# Patient Record
Sex: FEMALE | Race: WHITE | Employment: PART TIME | URBAN - NONMETROPOLITAN AREA
[De-identification: names, ages, dates, MRNs, and addresses within clinical notes are randomized per-mention and may not be internally consistent; named-entity substitution may affect disease eponyms.]

---

## 2017-05-15 ENCOUNTER — OFFICE VISIT - GICH (OUTPATIENT)
Dept: FAMILY MEDICINE | Facility: OTHER | Age: 22
End: 2017-05-15

## 2017-05-15 ENCOUNTER — HISTORY (OUTPATIENT)
Dept: FAMILY MEDICINE | Facility: OTHER | Age: 22
End: 2017-05-15

## 2017-05-15 DIAGNOSIS — B97.89 OTHER VIRAL AGENTS AS THE CAUSE OF DISEASES CLASSIFIED ELSEWHERE: ICD-10-CM

## 2017-05-15 DIAGNOSIS — J06.9 ACUTE UPPER RESPIRATORY INFECTION: ICD-10-CM

## 2017-05-15 DIAGNOSIS — J02.9 ACUTE PHARYNGITIS: ICD-10-CM

## 2017-05-15 LAB — STREP A ANTIGEN - HISTORICAL: NEGATIVE

## 2017-07-12 ENCOUNTER — APPOINTMENT (OUTPATIENT)
Dept: OCCUPATIONAL MEDICINE | Facility: OTHER | Age: 22
End: 2017-07-12

## 2017-09-11 ENCOUNTER — OFFICE VISIT - GICH (OUTPATIENT)
Dept: FAMILY MEDICINE | Facility: OTHER | Age: 22
End: 2017-09-11

## 2017-09-11 ENCOUNTER — HISTORY (OUTPATIENT)
Dept: FAMILY MEDICINE | Facility: OTHER | Age: 22
End: 2017-09-11

## 2017-09-11 DIAGNOSIS — Z01.89 ENCOUNTER FOR OTHER SPECIFIED SPECIAL EXAMINATIONS (CODE): ICD-10-CM

## 2017-09-11 DIAGNOSIS — B97.89 OTHER VIRAL AGENTS AS THE CAUSE OF DISEASES CLASSIFIED ELSEWHERE: ICD-10-CM

## 2017-09-11 DIAGNOSIS — R50.9 FEVER: ICD-10-CM

## 2017-09-11 DIAGNOSIS — J06.9 ACUTE UPPER RESPIRATORY INFECTION: ICD-10-CM

## 2017-09-11 LAB
INFLUENZA ANTIGEN - HISTORICAL: NORMAL
STREP A ANTIGEN - HISTORICAL: NEGATIVE

## 2017-09-13 LAB — LYME SCREEN W/REFLEX WEST BLOT - HISTORICAL: NEGATIVE

## 2017-12-27 NOTE — PROGRESS NOTES
Patient Information     Patient Name MRN Sex Bambi Patricia [id number] Female 1995      Progress Notes by Amelia Cook NP at 2017  7:00 PM     Author:  Amelia Cook NP Service:  (none) Author Type:  PHYS- Nurse Practitioner     Filed:  2017  8:54 PM Encounter Date:  2017 Status:  Signed     :  Amelia Cook NP (PHYS- Nurse Practitioner)            HPI:    Bambi Cagle is a 22 y.o. female who presents to clinic today for fever.   States fevers at home up to 104 last night.  Sore throat, runny and stuffy nose started last night.  Minimal dry occasional cough to clear throat.   No difficulty breathing or shortness of breath.  Some neck soreness today.   No headaches.  Generalized fatigue and malaise.  No known tick bites.  No rashes.  No body aches or joint aches other than neck.   Appetite decreased.  Some nausea with eating, no vomiting, no diarrhea.  Alternating Tylenol and Ibuprofen.  States history of mono and lyme about 3 years ago.          Past Medical History:     Diagnosis  Date     No Significant Past Medical History      Past Surgical History:      Procedure  Laterality Date     extraction of tooth  2002     TONSIL AND ADENOIDECTOMY  2003     Social History       Substance Use Topics         Smoking status:   Never Smoker     Smokeless tobacco:   Never Used     Alcohol use   No      Comment: Occasional      Current Outpatient Prescriptions       Medication  Sig Dispense Refill     multivitamin (MULTIPLE VITAMINS) tablet Take 1 tablet by mouth once daily.  0     norgestimate-ethinyl estradiol (TRI-SPRINTEC) 0.18/0.215/0.25 mg-35 mcg (28) tablet Take 1 tablet by mouth once daily.  0     No current facility-administered medications for this visit.      Medications have been reviewed by me and are current to the best of my knowledge and ability.    No Known Allergies    Past medical history, past surgical history, current medications and allergies  "reviewed and accurate to the best of my knowledge.        ROS:  Refer to HPI    /78  Pulse 86  Temp 98.9  F (37.2  C) (Tympanic)   Resp 18  Ht 1.854 m (6' 1\")  Wt 82.5 kg (181 lb 12.8 oz)  LMP 08/28/2017  Breastfeeding? No  BMI 23.99 kg/m2    EXAM:  General Appearance: Well appearing adult female, non ill appearance, appropriate appearance for age. No acute distress  Head: normocephalic, atraumatic  Ears: Left TM with bony landmarks appreciated, no erythema, no effusion, no bulging, no purulence.  Right TM with bony landmarks appreciated, no erythema, no effusion, no bulging, no purulence.   Left auditory canal clear.  Right auditory canal clear.  Normal external ears, non tender.  Eyes: conjunctivae normal without erythema or irritation, no drainage, pupils equal  Orophayrnx: moist mucous membranes, posterior pharynx with erythema, tonsils surgically absent, no oral lesions or post nasal drainage.  Neck: supple without adenopathy  Respiratory: normal chest wall and respirations.  Normal effort.  Clear to auscultation bilaterally, no wheezing, crackles or rhonchi.  No increased work of breathing.  No cough appreciated.  Cardiac: RRR with no murmurs  Musculoskeletal:  Normal movement of neck.  Normal gait.  Equal movement of bilateral upper extremities.  Equal movement of bilateral lower extremities.    Psychological: normal affect, alert and pleasant      Labs:  Results for orders placed or performed in visit on 09/11/17       INFLUENZA ANTIGEN A & B       Result   Value Ref Range    INFLUENZA ANTIGEN                                 Negative for Influenza A antigen; Negative for Influenza B antigen     RAPID STREP WITH REFLEX CULTURE       Result   Value Ref Range    STREP A ANTIGEN            Negative Negative           ASSESSMENT/PLAN:    ICD-10-CM    1. Patient request for diagnostic testing Z01.89 INFLUENZA ANTIGEN A & B      RAPID STREP WITH REFLEX CULTURE      LYME SCREEN W/REFLEX      LYME " SCREEN W/REFLEX      INFLUENZA ANTIGEN A & B      RAPID STREP WITH REFLEX CULTURE   2. Fever in adult R50.9 INFLUENZA ANTIGEN A & B      RAPID STREP WITH REFLEX CULTURE      LYME SCREEN W/REFLEX      LYME SCREEN W/REFLEX      INFLUENZA ANTIGEN A & B      RAPID STREP WITH REFLEX CULTURE   3. Viral URI with cough J06.9      B97.89          Patient requests testing for strep, influenza, and lyme  Negative rapid strep test  Negative influenza test  Likely viral URI.  No antibiotics indicated at this time.    Lyme test pending  Encouraged fluids  Symptomatic treatment - salt water gargles, honey, elevation, humidifier, sinus rinse/netti pot, lozenges, etc   Tylenol or ibuprofen PRN  Follow up if symptoms persist or worsen or concerns          Patient Instructions   Negative rapid strep test    Influenza test pending    Lyme test pending    Symptoms likely due to virus. No antibiotic is needed at this time.       Most coughs are caused by a viral infection.   Usually coughs can last 2 to 3 weeks. Sometimes the cough becomes loose (wet) for a few days, and your child coughs up a lot of phlegm (mucus). This is usually a sign that the end of the illness is near.    Most sore throats are caused by viruses and are part of a cold. About 10% of sore throats are caused by strep bacteria.    Encouraged fluids and rest.    May use symptomatic care with tylenol or ibuprofen.     Using a humidifier works well to break up the congestion.     Elevate the mattress to 15 degrees in order to help with the congestion.    Frequent swallows of cool liquid.      Oatmeal or honey coats the throat and some patients find it soothes the pain.     Salt water gargles as needed    Return to clinic with change/worsening of symptoms or concerns.

## 2017-12-29 NOTE — PATIENT INSTRUCTIONS
Patient Information     Patient Name MRN Bambi Parada [id number] Female 1995      Patient Instructions by Amelia Cook NP at 2017  7:00 PM     Author:  Amelia Cook NP Service:  (none) Author Type:  PHYS- Nurse Practitioner     Filed:  2017  7:52 PM Encounter Date:  2017 Status:  Signed     :  Amelia Cook NP (PHYS- Nurse Practitioner)            Negative rapid strep test    Influenza test pending    Lyme test pending    Symptoms likely due to virus. No antibiotic is needed at this time.       Most coughs are caused by a viral infection.   Usually coughs can last 2 to 3 weeks. Sometimes the cough becomes loose (wet) for a few days, and your child coughs up a lot of phlegm (mucus). This is usually a sign that the end of the illness is near.    Most sore throats are caused by viruses and are part of a cold. About 10% of sore throats are caused by strep bacteria.    Encouraged fluids and rest.    May use symptomatic care with tylenol or ibuprofen.     Using a humidifier works well to break up the congestion.     Elevate the mattress to 15 degrees in order to help with the congestion.    Frequent swallows of cool liquid.      Oatmeal or honey coats the throat and some patients find it soothes the pain.     Salt water gargles as needed    Return to clinic with change/worsening of symptoms or concerns.

## 2017-12-30 NOTE — NURSING NOTE
Patient Information     Patient Name MRN Sex Bambi Patricia [id number] Female 1995      Nursing Note by Carol Schneider at 2017  7:00 PM     Author:  Carol Schneider Service:  (none) Author Type:  NURS- Student Practical Nurse     Filed:  2017  7:06 PM Encounter Date:  2017 Status:  Signed     :  Carol Schneider (NURS- Student Practical Nurse)            Patient presents to the clinic for high fevers. Think she might have influenza. Fevers has been running around 104 at home. Has been using tylenol and ibuprofen for fever control. States she is so lethargic she is sleeping more than 12 hours at a time. Started yesterday morning. Has recently had mono and limes.   Carol Schneider LPN............................ 2017 6:53 PM

## 2018-01-05 NOTE — NURSING NOTE
Patient Information     Patient Name MRN Bambi Parada [id number] Female 1995      Nursing Note by Charlene Garcia at 5/15/2017  1:00 PM     Author:  Charlene Garcia Service:  (none) Author Type:  (none)     Filed:  5/15/2017 12:59 PM Encounter Date:  5/15/2017 Status:  Signed     :  Charlene Garcia            Patient presents to clinic with concerns about an URI she has had for about a week.  Charlene GarciaLPN ....................  5/15/2017   12:51 PM

## 2018-01-05 NOTE — PROGRESS NOTES
Patient Information     Patient Name MRN Sex Bambi Patricia [id number] Female 1995      Progress Notes by Amelia Cook NP at 5/15/2017  1:00 PM     Author:  Amelia Cook NP Service:  (none) Author Type:  PHYS- Nurse Practitioner     Filed:  5/15/2017  2:02 PM Encounter Date:  5/15/2017 Status:  Signed     :  Amelia Cook NP (PHYS- Nurse Practitioner)            HPI:    Bambi Cagle is a 22 y.o. female who presents to clinic today for URI.  Started a week ago with irritated sore throat.  Throat with tingling sensation and sore for the past week.  Cough for the past 3-4 days.  Coughing up thick yellow phlegm in the mornings, mild phlegm through out the day.  Chest tightness and some shortness of breath.  Runny and stuffy nose.  Post nasal drainage.  Cervical lymph nodes sore to touch.  Feeling fatigued and run down.  Low energy.  Intermittent headaches.  No body aches.  No fevers.  No chills.  Some hot spells/sweats.  Appetite decreased.  States symptoms were worsening every day, until today, now symptoms are the same without improvement or worsening.  Under a lot of stress, mother just passed away 3 weeks ago.  Also works in hospital.  Taking Dayquil, Nyquil, Ibuprofen and Emergent C (nothing today).               Past Medical History:     Diagnosis  Date     No Significant Past Medical History      Past Surgical History:      Procedure  Laterality Date     extraction of tooth  2002     TONSIL AND ADENOIDECTOMY  2003     Social History       Substance Use Topics         Smoking status:   Never Smoker     Smokeless tobacco:   Never Used     Alcohol use   No      Comment: Occasional      Current Outpatient Prescriptions       Medication  Sig Dispense Refill     multivitamin (MULTIPLE VITAMINS) tablet Take 1 tablet by mouth once daily.  0     norgestimate-ethinyl estradiol (TRI-SPRINTEC) 0.18/0.215/0.25 mg-35 mcg (28) tablet Take 1 tablet by mouth once daily.  0     No  "current facility-administered medications for this visit.      Medications have been reviewed by me and are current to the best of my knowledge and ability.    No Known Allergies    Past medical history, past surgical history, current medications and allergies reviewed and accurate to the best of my knowledge.        ROS:  Refer to HPI    /62  Pulse 80  Temp 98.8  F (37.1  C) (Tympanic)   Resp 18  Ht 1.854 m (6' 1\")  Wt 82.8 kg (182 lb 9.6 oz)  LMP 05/07/2017  Breastfeeding? No  BMI 24.09 kg/m2    EXAM:  General Appearance: Well appearing female adolescent, appropriate appearance for age. No acute distress  Head: normocephalic, atraumatic  Ears: Left TM with bony landmarks appreciated, no erythema, no effusion, no bulging, no purulence.  Right TM with bony landmarks appreciated, no erythema, no effusion, no bulging, no purulence.   Left auditory canal clear.  Right auditory canal clear.  Normal external ears, non tender.  Eyes: conjunctivae normal, no drainage  Orophayrnx: moist mucous membranes, posterior pharynx with mild erythema, tonsils surgically absent, moderate post nasal drip seen.    Neck: supple without adenopathy  Respiratory: normal chest wall and respirations.  Normal effort.  Clear to auscultation bilaterally, no wheezing, crackles or rhonchi.  No increased work of breathing.  No cough appreciated.  Cardiac: RRR with no murmurs  Psychological: normal affect, alert and pleasant      Labs:  Results for orders placed or performed in visit on 05/15/17      RAPID STREP WITH REFLEX CULTURE      Result  Value Ref Range    STREP A ANTIGEN           Negative Negative           ASSESSMENT/PLAN:    ICD-10-CM    1. Sore throat J02.9 RAPID STREP WITH REFLEX CULTURE      RAPID STREP WITH REFLEX CULTURE   2. Viral URI with cough J06.9      B97.89          Negative rapid strep test  Symptoms and exam consistent with viral URI - no fevers, symptoms of 3-7 days  Encouraged fluids  Symptomatic treatment - " salt water gargles, honey, elevation, humidifier, sinus rinse/netti pot, lozenges, etc   Tylenol or ibuprofen PRN  Follow up if symptoms persist or worsen or concerns (especially cough)        Patient Instructions   Negative rapid strep test    Symptoms likely due to virus. No antibiotic is needed at this time.       Most coughs are caused by a viral infection.   Usually coughs can last 2 to 3 weeks. Sometimes the cough becomes loose (wet) for a few days, and your child coughs up a lot of phlegm (mucus). This is usually a sign that the end of the illness is near.    Most sore throats are caused by viruses and are part of a cold. About 10% of sore throats are caused by strep bacteria.    Encouraged fluids and rest.    May use symptomatic care with tylenol or ibuprofen.     Using a humidifier works well to break up the congestion.     Elevate the mattress to 15 degrees in order to help with the congestion.    Frequent swallows of cool liquid.      Oatmeal or honey coats the throat and some patients find it soothes the pain.     Salt water gargles as needed    Return to clinic with change/worsening of symptoms or concerns.

## 2018-01-05 NOTE — PATIENT INSTRUCTIONS
Patient Information     Patient Name MRN Bambi Parada [id number] Female 1995      Patient Instructions by Amelia Cook NP at 5/15/2017  1:00 PM     Author:  Amelia Cook NP Service:  (none) Author Type:  PHYS- Nurse Practitioner     Filed:  5/15/2017  1:29 PM Encounter Date:  5/15/2017 Status:  Signed     :  Amelia Cook NP (PHYS- Nurse Practitioner)            Negative rapid strep test    Symptoms likely due to virus. No antibiotic is needed at this time.       Most coughs are caused by a viral infection.   Usually coughs can last 2 to 3 weeks. Sometimes the cough becomes loose (wet) for a few days, and your child coughs up a lot of phlegm (mucus). This is usually a sign that the end of the illness is near.    Most sore throats are caused by viruses and are part of a cold. About 10% of sore throats are caused by strep bacteria.    Encouraged fluids and rest.    May use symptomatic care with tylenol or ibuprofen.     Using a humidifier works well to break up the congestion.     Elevate the mattress to 15 degrees in order to help with the congestion.    Frequent swallows of cool liquid.      Oatmeal or honey coats the throat and some patients find it soothes the pain.     Salt water gargles as needed    Return to clinic with change/worsening of symptoms or concerns.

## 2018-01-09 ENCOUNTER — OFFICE VISIT - GICH (OUTPATIENT)
Dept: OTOLARYNGOLOGY | Facility: OTHER | Age: 23
End: 2018-01-09

## 2018-01-09 DIAGNOSIS — Z00.00 ENCOUNTER FOR GENERAL ADULT MEDICAL EXAMINATION WITHOUT ABNORMAL FINDINGS: ICD-10-CM

## 2018-01-26 VITALS
HEART RATE: 80 BPM | SYSTOLIC BLOOD PRESSURE: 118 MMHG | DIASTOLIC BLOOD PRESSURE: 62 MMHG | WEIGHT: 182.6 LBS | BODY MASS INDEX: 24.73 KG/M2 | RESPIRATION RATE: 18 BRPM | HEIGHT: 72 IN | TEMPERATURE: 98.8 F

## 2018-01-26 VITALS
WEIGHT: 181.8 LBS | SYSTOLIC BLOOD PRESSURE: 122 MMHG | RESPIRATION RATE: 18 BRPM | BODY MASS INDEX: 24.62 KG/M2 | HEIGHT: 72 IN | HEART RATE: 86 BPM | DIASTOLIC BLOOD PRESSURE: 78 MMHG | TEMPERATURE: 98.9 F

## 2018-02-09 ENCOUNTER — DOCUMENTATION ONLY (OUTPATIENT)
Dept: FAMILY MEDICINE | Facility: OTHER | Age: 23
End: 2018-02-09

## 2018-02-09 RX ORDER — NORGESTIMATE AND ETHINYL ESTRADIOL 7DAYSX3 28
1 KIT ORAL DAILY
COMMUNITY
Start: 2013-05-16 | End: 2021-10-17

## 2018-02-09 RX ORDER — DIPHENOXYLATE HYDROCHLORIDE AND ATROPINE SULFATE 2.5; .025 MG/1; MG/1
1 TABLET ORAL DAILY
COMMUNITY
Start: 2013-05-16

## 2018-02-13 NOTE — PROGRESS NOTES
Patient Information     Patient Name MRN Bambi Parada [id number] Female 1995      Progress Notes by Zelda Mckeon at 2018 12:40 PM     Author:  Zelda Mckeon Service:  (none) Author Type:  (none)     Filed:  2018  9:41 AM Encounter Date:  2018 Status:  Signed     :  Zelda Mckeon            See scanned document.

## 2018-05-01 ENCOUNTER — OFFICE VISIT (OUTPATIENT)
Dept: OTOLARYNGOLOGY | Facility: OTHER | Age: 23
End: 2018-05-01
Attending: OTOLARYNGOLOGY
Payer: COMMERCIAL

## 2018-05-01 DIAGNOSIS — Z09 POSTOP CHECK: Primary | ICD-10-CM

## 2018-05-01 PROCEDURE — G0463 HOSPITAL OUTPT CLINIC VISIT: HCPCS

## 2018-05-01 NOTE — PROGRESS NOTES
ORACIO DANGELO    23 Y old Female, : 1995    Account Number: [id number]    [address], [place]    Home: [phone]     Guarantor: ORACIO DANGELO Insurance: [employer] Payer ID: [id number]   PCP: SAMIR HERNÁNDEZ   Appointment Facility: [facility]      2018 Progress Notes: Jose Rao MD        Reason for Appointment     1. PO THYROID/SWELLING     2. Postop check     History of Present Illness     HPI:   The patient is a 23-year-old female who had thyroid surgery yesterday. She called because of concerns of swelling. I asked her to come to the office today for evaluation.     Examination     General Examination:  Her voice is excellent.   Her wound looks good. There is absolutely minimal swelling or fluid collection. There is no evidence of infection.       Assessments     1. Postop check - Z09 (Primary)     Treatment     1. Others   Notes: Doing well, she will keep her follow-up appointment as scheduled.  Procedures  [ ].                Follow Up     2 Weeks                         Electronically signed by JOSE RAO MD on 2018 at 01:16 PM CDT     Sign off status: Completed         Review of Systems     [facility]  [address] [place] [address]  Tel: [phone]  Fax:       [ ].           Patient: ORACIO DANGELO : 1995 Progress Note: Jose Rao MD 2018        Note generated by [employer] EMR/PM Software ([email or web address])

## 2018-05-01 NOTE — MR AVS SNAPSHOT
"              After Visit Summary   2018    Bambi Cagle    MRN: [id number]           Patient Information     Date Of Birth          1995        Visit Information        Provider Department      2018 11:40 AM Jose Rao MD [facility]        Today's Diagnoses     Postop check    -  1       Follow-ups after your visit        Who to contact     If you have questions or need follow up information about today's clinic visit or your schedule please contact [facility] directly at [phone].  Normal or non-critical lab and imaging results will be communicated to you by [employer], letter or phone within 4 business days after the clinic has received the results. If you do not hear from us within 7 days, please contact the clinic through [employer] or phone. If you have a critical or abnormal lab result, we will notify you by phone as soon as possible.  Submit refill requests through [employer] or call your pharmacy and they will forward the refill request to us. Please allow 3 business days for your refill to be completed.          Additional Information About Your Visit        MyChart Information     [employer] lets you send messages to your doctor, view your test results, renew your prescriptions, schedule appointments and more. To sign up, go to [email or web address] . Click on \"Log in\" on the left side of the screen, which will take you to the Welcome page. Then click on \"Sign up Now\" on the right side of the page.     You will be asked to enter the access code listed below, as well as some personal information. Please follow the directions to create your username and password.     Your access code is: [email or web address]  Expires: 2018  7:28 AM     Your access code will  in 90 days. If you need help or a new code, please call your [facility] or [phone].        Care EveryWhere ID     This is your Care EveryWhere ID. This could be used by other " organizations to access your [place] medical records  [id number]         Blood Pressure from Last 3 Encounters:   09/11/17 122/78   05/15/17 118/62   12/19/16 100/60    Weight from Last 3 Encounters:   09/11/17 82.5 kg (181 lb 12.8 oz)   05/15/17 82.8 kg (182 lb 9.6 oz)   12/19/16 81.2 kg (179 lb)              Today, you had the following     No orders found for display       Primary Care Provider Office Phone # Fax #    [place] [phone]       [facility] [address] [facility] [address]        Equal Access to Services     LUIS ALBERTO PRAKASH : Hadii maria g Casillas, waselena cruz, qakelle kaalmada liam, stanton norwood. So [facility] [phone].    ATENCIÓN: Si habla español, tiene a chun disposición servicios gratuitos de asistencia lingüística. Llame al [phone].    We comply with applicable federal civil rights laws and [place] laws. We do not discriminate on the basis of race, color, national origin, age, disability, sex, sexual orientation, or gender identity.            Thank you!     Thank you for choosing [facility] AND [facility]  for your care. Our goal is always to provide you with excellent care. Hearing back from our patients is one way we can continue to improve our services. Please take a few minutes to complete the written survey that you may receive in the mail after your visit with us. Thank you!             Your Updated Medication List - Protect others around you: Learn how to safely use, store and throw away your medicines at www.disposemymeds.org.          This list is accurate as of 5/1/18 11:59 PM.  Always use your most recent med list.                   Brand Name Dispense Instructions for use Diagnosis    DAILY MULTIVITAMIN PO           IBUPROFEN PO      Take 400 mg by mouth every 6 hours as needed for moderate pain        MULTI-VITAMINS Tabs      Take 1 tablet by mouth daily        * TRI-SPRINTEC 0.18/0.215/0.25  MG-35 MCG per tablet   Generic drug:  norgestim-eth estrad triphasic      Take 1 tablet by mouth daily        * norgestim-eth estrad triphasic 0.18/0.215/0.25 MG-35 MCG per tablet    ORTHO TRI-CYCLEN, TRI-SPRINTEC     Take 1 tablet by mouth daily        * Notice:  This list has 2 medication(s) that are the same as other medications prescribed for you. Read the directions carefully, and ask your doctor or other care provider to review them with you.

## 2018-06-22 ENCOUNTER — OFFICE VISIT (OUTPATIENT)
Dept: FAMILY MEDICINE | Facility: OTHER | Age: 23
End: 2018-06-22
Attending: NURSE PRACTITIONER
Payer: COMMERCIAL

## 2018-06-22 VITALS
TEMPERATURE: 98.4 F | WEIGHT: 181 LBS | HEART RATE: 72 BPM | SYSTOLIC BLOOD PRESSURE: 118 MMHG | BODY MASS INDEX: 23.88 KG/M2 | RESPIRATION RATE: 16 BRPM | DIASTOLIC BLOOD PRESSURE: 76 MMHG

## 2018-06-22 DIAGNOSIS — R39.89 URINARY PROBLEM: ICD-10-CM

## 2018-06-22 DIAGNOSIS — A74.9 CHLAMYDIA: Primary | ICD-10-CM

## 2018-06-22 LAB
ALBUMIN UR-MCNC: NEGATIVE MG/DL
APPEARANCE UR: CLEAR
BILIRUB UR QL STRIP: NEGATIVE
C TRACH DNA SPEC QL PROBE+SIG AMP: ABNORMAL
COLOR UR AUTO: YELLOW
GLUCOSE UR STRIP-MCNC: NEGATIVE MG/DL
HGB UR QL STRIP: NEGATIVE
KETONES UR STRIP-MCNC: NEGATIVE MG/DL
LEUKOCYTE ESTERASE UR QL STRIP: NEGATIVE
N GONORRHOEA DNA SPEC QL PROBE+SIG AMP: NOT DETECTED
NITRATE UR QL: NEGATIVE
PH UR STRIP: 6 PH (ref 5–7)
SOURCE: NORMAL
SP GR UR STRIP: 1.02 (ref 1–1.03)
SPECIMEN SOURCE: ABNORMAL
UROBILINOGEN UR STRIP-ACNC: 0.2 EU/DL (ref 0.2–1)

## 2018-06-22 PROCEDURE — 87491 CHLMYD TRACH DNA AMP PROBE: CPT | Performed by: NURSE PRACTITIONER

## 2018-06-22 PROCEDURE — 81003 URINALYSIS AUTO W/O SCOPE: CPT | Performed by: NURSE PRACTITIONER

## 2018-06-22 PROCEDURE — 99213 OFFICE O/P EST LOW 20 MIN: CPT | Performed by: NURSE PRACTITIONER

## 2018-06-22 PROCEDURE — 87591 N.GONORRHOEAE DNA AMP PROB: CPT | Performed by: NURSE PRACTITIONER

## 2018-06-22 RX ORDER — NORGESTIMATE AND ETHINYL ESTRADIOL 7DAYSX3 28
KIT ORAL
COMMUNITY
Start: 2018-05-29 | End: 2021-10-17

## 2018-06-23 ENCOUNTER — TELEPHONE (OUTPATIENT)
Dept: FAMILY MEDICINE | Facility: OTHER | Age: 23
End: 2018-06-23

## 2018-06-23 DIAGNOSIS — A74.9 CHLAMYDIA INFECTION: Primary | ICD-10-CM

## 2018-06-23 DIAGNOSIS — A74.9 CHLAMYDIA INFECTION: ICD-10-CM

## 2018-06-23 RX ORDER — AZITHROMYCIN 500 MG/1
1000 TABLET, FILM COATED ORAL ONCE
Qty: 2 TABLET | Refills: 0 | Status: SHIPPED | OUTPATIENT
Start: 2018-06-23 | End: 2018-06-23

## 2018-06-23 ASSESSMENT — ENCOUNTER SYMPTOMS
DYSURIA: 0
FREQUENCY: 1
HEMATURIA: 0
FEVER: 0

## 2018-06-23 NOTE — PROGRESS NOTES
"HPI Comments: Nursing Notes:   Ranjan Rocha, LPN  6/22/2018  7:56 PM  Unsigned  Patient presents to clinic for complaint of urinary frequency, urgency,   and general vaginal discomfort. She says symptoms began a couple weeks ago   and have gotten worse recently. She has concerns that the issues may be   STI related.  Ranjan Rocha LPN...... 7:54 PM 6/22/2018    More freuqently and urgently needing to go to the bathroom-symptoms started several weeks ago. Uncomfortable \"down there.\"  Recently caught boyfriend cheating on her concerned about STD. Denies blood in urine or fevers, vaginal discharge. Hasn't treated symptoms at this time. History of frequent UTIs.     UTI   Pertinent negatives include no fever.         Review of Systems   Constitutional: Negative for fever.   Genitourinary: Positive for frequency and urgency. Negative for dysuria and hematuria.         Physical Exam   Constitutional: She is well-developed, well-nourished, and in no distress.   Genitourinary: Vagina normal and cervix normal.   Neurological: She is alert.   Skin: Skin is warm and dry.   Psychiatric: Affect normal.     Assessment: well appearing female without fever, vagina and cervix intact without erythema, scant white discharge without odor    Results for orders placed or performed in visit on 06/22/18   *UA reflex to Microscopic   Result Value Ref Range    Color Urine Yellow     Appearance Urine Clear     Glucose Urine Negative NEG^Negative mg/dL    Bilirubin Urine Negative NEG^Negative    Ketones Urine Negative NEG^Negative mg/dL    Specific Gravity Urine 1.025 1.003 - 1.035    Blood Urine Negative NEG^Negative    pH Urine 6.0 5.0 - 7.0 pH    Protein Albumin Urine Negative NEG^Negative mg/dL    Urobilinogen Urine 0.2 0.2 - 1.0 EU/dL    Nitrite Urine Negative NEG^Negative    Leukocyte Esterase Urine Negative NEG^Negative    Source Midstream Urine    GC/Chlamydia by PCR - HI,[facility]   Result Value Ref Range    Specimen Source Vagina     " Neisseria gonorrhoreae PCR Not Detected NDET^Not Detected    Chlamydia Trachomatis PCR Detected, Abnormal Result (AA) NDET^Not Detected     Diagnosis: Chlamydia    Treat with Zithromax 1000 mgs once  No sex for 7 days  Follow up as needed

## 2018-06-23 NOTE — TELEPHONE ENCOUNTER
Patient called stating the pharmacy previously stated is closed today and would like medication sent to other pharmacy on [address] [address] in [place], [place]    Laly Malagon on 6/23/2018 at 1:14 PM    Noted, new rx sent in.     Alicia Dash NP on 6/23/2018 at 1:32 PM

## 2018-06-23 NOTE — NURSING NOTE
Patient presents to clinic for complaint of urinary frequency, urgency, and general vaginal discomfort. She says symptoms began a couple weeks ago and have gotten worse recently. She has concerns that the issues may be STI related.  Ranjan Rocha LPN...... 7:54 PM 6/22/2018

## 2019-09-09 ENCOUNTER — APPOINTMENT (OUTPATIENT)
Dept: OCCUPATIONAL MEDICINE | Facility: OTHER | Age: 24
End: 2019-09-09

## 2019-09-09 PROCEDURE — 99080 SPECIAL REPORTS OR FORMS: CPT

## 2019-09-09 PROCEDURE — 99199 UNLISTED SPECIAL SVC PX/RPRT: CPT

## 2020-04-21 ENCOUNTER — HOSPITAL ENCOUNTER (EMERGENCY)
Facility: HOSPITAL | Age: 25
Discharge: HOME | End: 2020-04-22
Attending: EMERGENCY MEDICINE | Admitting: EMERGENCY MEDICINE
Payer: COMMERCIAL

## 2020-04-21 ENCOUNTER — APPOINTMENT (OUTPATIENT)
Dept: GENERAL RADIOLOGY | Facility: HOSPITAL | Age: 25
End: 2020-04-21
Attending: EMERGENCY MEDICINE
Payer: COMMERCIAL

## 2020-04-21 ENCOUNTER — APPOINTMENT (OUTPATIENT)
Dept: CT IMAGING | Facility: HOSPITAL | Age: 25
End: 2020-04-21
Attending: EMERGENCY MEDICINE
Payer: COMMERCIAL
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
## 2020-04-21 DIAGNOSIS — S09.90XA CLOSED HEAD INJURY, INITIAL ENCOUNTER: ICD-10-CM

## 2020-04-21 LAB
ANION GAP SERPL CALCULATED.3IONS-SCNC: 4 MMOL/L (ref 3–14)
BASOPHILS # BLD AUTO: 0 10E9/L (ref 0–0.2)
BASOPHILS NFR BLD AUTO: 0.3 %
BUN SERPL-MCNC: 10 MG/DL (ref 7–30)
CALCIUM SERPL-MCNC: 9.3 MG/DL (ref 8.5–10.1)
CHLORIDE SERPL-SCNC: 108 MMOL/L (ref 94–109)
CO2 SERPL-SCNC: 27 MMOL/L (ref 20–32)
CREAT SERPL-MCNC: 0.98 MG/DL (ref 0.52–1.04)
DIFFERENTIAL METHOD BLD: ABNORMAL
EOSINOPHIL # BLD AUTO: 0.1 10E9/L (ref 0–0.7)
EOSINOPHIL NFR BLD AUTO: 0.7 %
ERYTHROCYTE [DISTWIDTH] IN BLOOD BY AUTOMATED COUNT: 11.8 % (ref 10–15)
GFR SERPL CREATININE-BSD FRML MDRD: 80 ML/MIN/{1.73_M2}
GLUCOSE SERPL-MCNC: 82 MG/DL (ref 70–99)
HCG UR QL: NEGATIVE
HCT VFR BLD AUTO: 42.7 % (ref 35–47)
HGB BLD-MCNC: 14.5 G/DL (ref 11.7–15.7)
IMM GRANULOCYTES # BLD: 0.1 10E9/L (ref 0–0.4)
IMM GRANULOCYTES NFR BLD: 0.5 %
LACTATE BLD-SCNC: 1.9 MMOL/L (ref 0.7–2)
LIPASE SERPL-CCNC: 159 U/L (ref 73–393)
LYMPHOCYTES # BLD AUTO: 3.2 10E9/L (ref 0.8–5.3)
LYMPHOCYTES NFR BLD AUTO: 25.2 %
MCH RBC QN AUTO: 30.3 PG (ref 26.5–33)
MCHC RBC AUTO-ENTMCNC: 34 G/DL (ref 31.5–36.5)
MCV RBC AUTO: 89 FL (ref 78–100)
MONOCYTES # BLD AUTO: 1.1 10E9/L (ref 0–1.3)
MONOCYTES NFR BLD AUTO: 8.8 %
NEUTROPHILS # BLD AUTO: 8.3 10E9/L (ref 1.6–8.3)
NEUTROPHILS NFR BLD AUTO: 64.5 %
NRBC # BLD AUTO: 0 10*3/UL
NRBC BLD AUTO-RTO: 0 /100
PLATELET # BLD AUTO: 307 10E9/L (ref 150–450)
POTASSIUM SERPL-SCNC: 4 MMOL/L (ref 3.4–5.3)
RBC # BLD AUTO: 4.79 10E12/L (ref 3.8–5.2)
SODIUM SERPL-SCNC: 139 MMOL/L (ref 133–144)
WBC # BLD AUTO: 12.8 10E9/L (ref 4–11)

## 2020-04-21 PROCEDURE — 36415 COLL VENOUS BLD VENIPUNCTURE: CPT | Performed by: EMERGENCY MEDICINE

## 2020-04-21 PROCEDURE — 25000132 ZZH RX MED GY IP 250 OP 250 PS 637: Performed by: EMERGENCY MEDICINE

## 2020-04-21 PROCEDURE — 99284 EMERGENCY DEPT VISIT MOD MDM: CPT | Mod: Z6 | Performed by: EMERGENCY MEDICINE

## 2020-04-21 PROCEDURE — 73590 X-RAY EXAM OF LOWER LEG: CPT | Mod: TC,RT

## 2020-04-21 PROCEDURE — 99284 EMERGENCY DEPT VISIT MOD MDM: CPT | Mod: 25

## 2020-04-21 PROCEDURE — 83690 ASSAY OF LIPASE: CPT | Performed by: EMERGENCY MEDICINE

## 2020-04-21 PROCEDURE — 99285 EMERGENCY DEPT VISIT HI MDM: CPT | Mod: 25

## 2020-04-21 PROCEDURE — 70450 CT HEAD/BRAIN W/O DYE: CPT | Mod: TC

## 2020-04-21 PROCEDURE — 72125 CT NECK SPINE W/O DYE: CPT | Mod: TC

## 2020-04-21 PROCEDURE — 71045 X-RAY EXAM CHEST 1 VIEW: CPT | Mod: TC

## 2020-04-21 PROCEDURE — 85025 COMPLETE CBC W/AUTO DIFF WBC: CPT | Performed by: EMERGENCY MEDICINE

## 2020-04-21 PROCEDURE — 83605 ASSAY OF LACTIC ACID: CPT | Performed by: EMERGENCY MEDICINE

## 2020-04-21 PROCEDURE — 81025 URINE PREGNANCY TEST: CPT | Performed by: EMERGENCY MEDICINE

## 2020-04-21 PROCEDURE — 80048 BASIC METABOLIC PNL TOTAL CA: CPT | Performed by: EMERGENCY MEDICINE

## 2020-04-21 RX ORDER — ACETAMINOPHEN 325 MG/1
650 TABLET ORAL ONCE
Status: COMPLETED | OUTPATIENT
Start: 2020-04-21 | End: 2020-04-21

## 2020-04-21 RX ADMIN — ACETAMINOPHEN 650 MG: 325 TABLET, FILM COATED ORAL at 23:47

## 2020-04-21 NOTE — ED AVS SNAPSHOT
[facility]  [address] [address]  Phone:  [phone]                                    Bambi Cagle   MRN: [id number]    Department:  [facility]   Date of Visit:  4/21/2020           After Visit Summary Signature Page    I have received my discharge instructions, and my questions have been answered. I have discussed any challenges I see with this plan with the nurse or doctor.    ..........................................................................................................................................  Patient/Patient Representative Signature      ..........................................................................................................................................  Patient Representative Print Name and Relationship to Patient    ..................................................               ................................................  Date                                   Time    ..........................................................................................................................................  Reviewed by Signature/Title    ...................................................              ..............................................  Date                                               Time          22EPIC Rev 08/18

## 2020-04-22 VITALS
OXYGEN SATURATION: 98 % | DIASTOLIC BLOOD PRESSURE: 81 MMHG | SYSTOLIC BLOOD PRESSURE: 123 MMHG | TEMPERATURE: 98 F | RESPIRATION RATE: 16 BRPM | HEART RATE: 70 BPM

## 2020-04-22 NOTE — DISCHARGE INSTRUCTIONS
Follow the aftercare instructions provided    You must follow-up with your doctor in 12 to 24 hours or return to the ER for recheck
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
## 2020-04-28 ASSESSMENT — ENCOUNTER SYMPTOMS
CARDIOVASCULAR NEGATIVE: 1
HEADACHES: 1
RESPIRATORY NEGATIVE: 1
HEMATOLOGIC/LYMPHATIC NEGATIVE: 1
CONSTITUTIONAL NEGATIVE: 1
EYES NEGATIVE: 1
ENDOCRINE NEGATIVE: 1
NECK STIFFNESS: 1
PSYCHIATRIC NEGATIVE: 1
GASTROINTESTINAL NEGATIVE: 1
ALLERGIC/IMMUNOLOGIC NEGATIVE: 1

## 2021-07-11 ENCOUNTER — HOSPITAL ENCOUNTER (EMERGENCY)
Facility: OTHER | Age: 26
Discharge: HOME | End: 2021-07-11
Admitting: FAMILY MEDICINE
Payer: COMMERCIAL

## 2021-07-11 DIAGNOSIS — R55 VASOVAGAL SYNCOPE: ICD-10-CM

## 2021-07-11 PROCEDURE — 99282 EMERGENCY DEPT VISIT SF MDM: CPT | Performed by: FAMILY MEDICINE

## 2021-07-11 ASSESSMENT — ENCOUNTER SYMPTOMS
FEVER: 0
ABDOMINAL PAIN: 0
SHORTNESS OF BREATH: 0

## 2021-07-11 NOTE — DOWNTIME EVENT NOTE
The EMR was down for 5 hours on 7/11/2021.    Wade was responsible for completing the paper charting during this time period.     The following information was re-entered into the system by Femi Hoffmann RN: Allergies, Problem list and Home meds    The following information will remain in the paper chart: medications given, discharge instructions, assessments.     Femi Hoffmann RN  7/11/2021
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
## 2021-10-17 ENCOUNTER — OFFICE VISIT (OUTPATIENT)
Dept: FAMILY MEDICINE | Facility: OTHER | Age: 26
End: 2021-10-17
Attending: NURSE PRACTITIONER
Payer: COMMERCIAL

## 2021-10-17 VITALS
DIASTOLIC BLOOD PRESSURE: 72 MMHG | OXYGEN SATURATION: 100 % | TEMPERATURE: 98 F | SYSTOLIC BLOOD PRESSURE: 126 MMHG | BODY MASS INDEX: 25.41 KG/M2 | HEART RATE: 54 BPM | WEIGHT: 187.6 LBS | HEIGHT: 72 IN | RESPIRATION RATE: 16 BRPM

## 2021-10-17 DIAGNOSIS — R30.0 DYSURIA: Primary | ICD-10-CM

## 2021-10-17 LAB
ALBUMIN UR-MCNC: 10 MG/DL
APPEARANCE UR: CLEAR
BACTERIA #/AREA URNS HPF: ABNORMAL /HPF
BILIRUB UR QL STRIP: NEGATIVE
COLOR UR AUTO: ABNORMAL
GLUCOSE UR STRIP-MCNC: NEGATIVE MG/DL
HGB UR QL STRIP: ABNORMAL
KETONES UR STRIP-MCNC: NEGATIVE MG/DL
LEUKOCYTE ESTERASE UR QL STRIP: ABNORMAL
MUCOUS THREADS #/AREA URNS LPF: PRESENT /LPF
NITRATE UR QL: NEGATIVE
PH UR STRIP: 6 [PH] (ref 5–9)
RBC URINE: 94 /HPF
SP GR UR STRIP: 1.01 (ref 1–1.03)
UROBILINOGEN UR STRIP-MCNC: NORMAL MG/DL
WBC URINE: 20 /HPF

## 2021-10-17 PROCEDURE — 87086 URINE CULTURE/COLONY COUNT: CPT | Mod: ZL | Performed by: FAMILY MEDICINE

## 2021-10-17 PROCEDURE — 81001 URINALYSIS AUTO W/SCOPE: CPT | Mod: ZL

## 2021-10-17 PROCEDURE — 99213 OFFICE O/P EST LOW 20 MIN: CPT | Performed by: FAMILY MEDICINE

## 2021-10-17 RX ORDER — NORGESTIMATE AND ETHINYL ESTRADIOL 7DAYSX3 28
1 KIT ORAL
COMMUNITY
Start: 2020-09-23

## 2021-10-17 RX ORDER — FLUCONAZOLE 150 MG/1
TABLET ORAL
COMMUNITY
Start: 2021-04-27 | End: 2021-10-17

## 2021-10-17 RX ORDER — SULFAMETHOXAZOLE/TRIMETHOPRIM 800-160 MG
1 TABLET ORAL 2 TIMES DAILY
Qty: 14 TABLET | Refills: 0 | Status: SHIPPED | OUTPATIENT
Start: 2021-10-17 | End: 2021-10-24

## 2021-10-17 RX ORDER — NITROFURANTOIN 25; 75 MG/1; MG/1
CAPSULE ORAL
COMMUNITY
Start: 2021-08-29

## 2021-10-17 ASSESSMENT — MIFFLIN-ST. JEOR: SCORE: 1721.76

## 2021-10-17 ASSESSMENT — PAIN SCALES - GENERAL: PAINLEVEL: MILD PAIN (2)

## 2021-10-17 NOTE — NURSING NOTE
"Chief Complaint   Patient presents with     Frequency     burning with urination, pain, onset 10/15/21     Patient presents to [facility] for frequency and burning with urination. Pain 2/10. Onset of symptoms: 10/15/21.     Initial /72 (BP Location: Left arm, Patient Position: Sitting, Cuff Size: Adult Regular)   Pulse 54   Temp 98  F (36.7  C) (Tympanic)   Resp 16   Ht 1.867 m (6' 1.5\")   Wt 85.1 kg (187 lb 9.6 oz)   SpO2 100%   BMI 24.42 kg/m   Estimated body mass index is 24.42 kg/m  as calculated from the following:    Height as of this encounter: 1.867 m (6' 1.5\").    Weight as of this encounter: 85.1 kg (187 lb 9.6 oz).     FOOD SECURITY SCREENING QUESTIONS  Hunger Vital Signs:  Within the past 12 months we worried whether our food would run out before we got money to buy more. Never  Within the past 12 months the food we bought just didn't last and we didn't have money to get more. Never      Medication Reconciliation: Complete      Diane Huber LPN   "

## 2021-10-17 NOTE — PROGRESS NOTES
"  CHIEF COMPLAINT    Dysuria for 2 days.      HISTORY    26-year-old woman has dysuria and frequency as well as suprapubic pressure for about 2 days. No fevers or chills.    She has had an occasional UTI. One back in August.      REVIEW OF SYSTEMS    Unremarkable except as above.      EXAM  /72 (BP Location: Left arm, Patient Position: Sitting, Cuff Size: Adult Regular)   Pulse 54   Temp 98  F (36.7  C) (Tympanic)   Resp 16   Ht 1.867 m (6' 1.5\")   Wt 85.1 kg (187 lb 9.6 oz)   SpO2 100%   BMI 24.42 kg/m      Appears well in general.  HEENT unremarkable.  No cough or labored breathing.  Abdomen nondistended.      Results for orders placed or performed in visit on 10/17/21   UA reflex to Microscopic and Culture     Status: Abnormal    Specimen: Urine, Midstream   Result Value Ref Range    Color Urine Light Yellow Colorless, Straw, Light Yellow, Yellow    Appearance Urine Clear Clear    Glucose Urine Negative Negative mg/dL    Bilirubin Urine Negative Negative    Ketones Urine Negative Negative mg/dL    Specific Gravity Urine 1.012 1.000 - 1.030    Blood Urine Moderate (A) Negative    pH Urine 6.0 5.0 - 9.0    Protein Albumin Urine 10  (A) Negative mg/dL    Urobilinogen Urine Normal Normal, 2.0 mg/dL    Nitrite Urine Negative Negative    Leukocyte Esterase Urine Large (A) Negative    Bacteria Urine Few (A) None Seen /HPF    Mucus Urine Present (A) None Seen /LPF    RBC Urine 94 (H) <=2 /HPF    WBC Urine 20 (H) <=5 /HPF    Narrative    Urine Culture ordered based on laboratory criteria         (R30.0) Dysuria  (primary encounter diagnosis)  Comment:   Plan: UA reflex to Microscopic and Culture, Urine         Culture, sulfamethoxazole-trimethoprim (BACTRIM        DS) 800-160 MG tablet              "

## 2021-10-19 LAB — BACTERIA UR CULT: ABNORMAL
